# Patient Record
Sex: FEMALE | Race: WHITE | Employment: FULL TIME | URBAN - NONMETROPOLITAN AREA
[De-identification: names, ages, dates, MRNs, and addresses within clinical notes are randomized per-mention and may not be internally consistent; named-entity substitution may affect disease eponyms.]

---

## 2021-11-04 ENCOUNTER — APPOINTMENT (OUTPATIENT)
Dept: CT IMAGING | Age: 66
End: 2021-11-04
Payer: MEDICAID

## 2021-11-04 ENCOUNTER — HOSPITAL ENCOUNTER (EMERGENCY)
Age: 66
Discharge: HOME | End: 2021-11-04
Payer: MEDICAID

## 2021-11-04 VITALS
WEIGHT: 145 LBS | HEART RATE: 71 BPM | TEMPERATURE: 99.1 F | HEIGHT: 66 IN | BODY MASS INDEX: 23.3 KG/M2 | OXYGEN SATURATION: 95 % | RESPIRATION RATE: 15 BRPM | DIASTOLIC BLOOD PRESSURE: 66 MMHG | SYSTOLIC BLOOD PRESSURE: 110 MMHG

## 2021-11-04 DIAGNOSIS — L03.311 CELLULITIS OF ABDOMINAL WALL: ICD-10-CM

## 2021-11-04 DIAGNOSIS — K42.9 PERIUMBILICAL HERNIA: Primary | ICD-10-CM

## 2021-11-04 LAB
ALBUMIN SERPL-MCNC: 4.2 G/DL (ref 3.5–5.2)
ALP BLD-CCNC: 74 U/L (ref 35–104)
ALT SERPL-CCNC: 11 U/L (ref 5–33)
AMORPHOUS: ABNORMAL /HPF
ANION GAP SERPL CALCULATED.3IONS-SCNC: 11 MMOL/L (ref 7–19)
AST SERPL-CCNC: 17 U/L (ref 5–32)
BASOPHILS ABSOLUTE: 0 K/UL (ref 0–0.2)
BASOPHILS RELATIVE PERCENT: 0.6 % (ref 0–1)
BILIRUB SERPL-MCNC: 0.3 MG/DL (ref 0.2–1.2)
BILIRUBIN URINE: NEGATIVE
BLOOD, URINE: NEGATIVE
BUN BLDV-MCNC: 9 MG/DL (ref 8–23)
CALCIUM SERPL-MCNC: 9 MG/DL (ref 8.8–10.2)
CHLORIDE BLD-SCNC: 104 MMOL/L (ref 98–111)
CLARITY: CLEAR
CO2: 24 MMOL/L (ref 22–29)
COLOR: YELLOW
CREAT SERPL-MCNC: 0.7 MG/DL (ref 0.5–0.9)
EOSINOPHILS ABSOLUTE: 0 K/UL (ref 0–0.6)
EOSINOPHILS RELATIVE PERCENT: 0.9 % (ref 0–5)
EPITHELIAL CELLS, UA: ABNORMAL /HPF
GFR AFRICAN AMERICAN: >59
GFR NON-AFRICAN AMERICAN: >60
GLUCOSE BLD-MCNC: 92 MG/DL (ref 74–109)
GLUCOSE URINE: NEGATIVE MG/DL
HCT VFR BLD CALC: 42.7 % (ref 37–47)
HEMOGLOBIN: 13.4 G/DL (ref 12–16)
IMMATURE GRANULOCYTES #: 0 K/UL
KETONES, URINE: 15 MG/DL
LACTIC ACID, SEPSIS: 1 MG/DL (ref 0.5–1.9)
LEUKOCYTE ESTERASE, URINE: ABNORMAL
LIPASE: 36 U/L (ref 13–60)
LYMPHOCYTES ABSOLUTE: 1 K/UL (ref 1.1–4.5)
LYMPHOCYTES RELATIVE PERCENT: 22.3 % (ref 20–40)
MCH RBC QN AUTO: 29.2 PG (ref 27–31)
MCHC RBC AUTO-ENTMCNC: 31.4 G/DL (ref 33–37)
MCV RBC AUTO: 93 FL (ref 81–99)
MONOCYTES ABSOLUTE: 0.3 K/UL (ref 0–0.9)
MONOCYTES RELATIVE PERCENT: 6.2 % (ref 0–10)
NEUTROPHILS ABSOLUTE: 3.3 K/UL (ref 1.5–7.5)
NEUTROPHILS RELATIVE PERCENT: 69.8 % (ref 50–65)
NITRITE, URINE: NEGATIVE
PDW BLD-RTO: 12.7 % (ref 11.5–14.5)
PH UA: 6 (ref 5–8)
PLATELET # BLD: 176 K/UL (ref 130–400)
PMV BLD AUTO: 10.2 FL (ref 9.4–12.3)
POTASSIUM REFLEX MAGNESIUM: 3.7 MMOL/L (ref 3.5–5)
PROTEIN UA: NEGATIVE MG/DL
RBC # BLD: 4.59 M/UL (ref 4.2–5.4)
RBC UA: ABNORMAL /HPF (ref 0–2)
SODIUM BLD-SCNC: 139 MMOL/L (ref 136–145)
SPECIFIC GRAVITY UA: 1.01 (ref 1–1.03)
TOTAL PROTEIN: 6.9 G/DL (ref 6.6–8.7)
UROBILINOGEN, URINE: 0.2 E.U./DL
WBC # BLD: 4.7 K/UL (ref 4.8–10.8)
WBC UA: ABNORMAL /HPF (ref 0–5)

## 2021-11-04 PROCEDURE — 99282 EMERGENCY DEPT VISIT SF MDM: CPT

## 2021-11-04 PROCEDURE — 83605 ASSAY OF LACTIC ACID: CPT

## 2021-11-04 PROCEDURE — 6360000004 HC RX CONTRAST MEDICATION: Performed by: PHYSICIAN ASSISTANT

## 2021-11-04 PROCEDURE — 2580000003 HC RX 258: Performed by: PHYSICIAN ASSISTANT

## 2021-11-04 PROCEDURE — 6360000002 HC RX W HCPCS: Performed by: PHYSICIAN ASSISTANT

## 2021-11-04 PROCEDURE — 96375 TX/PRO/DX INJ NEW DRUG ADDON: CPT

## 2021-11-04 PROCEDURE — 80053 COMPREHEN METABOLIC PANEL: CPT

## 2021-11-04 PROCEDURE — 96374 THER/PROPH/DIAG INJ IV PUSH: CPT

## 2021-11-04 PROCEDURE — 87040 BLOOD CULTURE FOR BACTERIA: CPT

## 2021-11-04 PROCEDURE — 85025 COMPLETE CBC W/AUTO DIFF WBC: CPT

## 2021-11-04 PROCEDURE — 81001 URINALYSIS AUTO W/SCOPE: CPT

## 2021-11-04 PROCEDURE — 74177 CT ABD & PELVIS W/CONTRAST: CPT

## 2021-11-04 PROCEDURE — 83690 ASSAY OF LIPASE: CPT

## 2021-11-04 PROCEDURE — 36415 COLL VENOUS BLD VENIPUNCTURE: CPT

## 2021-11-04 RX ORDER — CEPHALEXIN 500 MG/1
500 CAPSULE ORAL 2 TIMES DAILY
Qty: 14 CAPSULE | Refills: 0 | Status: SHIPPED | OUTPATIENT
Start: 2021-11-04 | End: 2021-11-11

## 2021-11-04 RX ORDER — LANOLIN ALCOHOL/MO/W.PET/CERES
800 CREAM (GRAM) TOPICAL DAILY
COMMUNITY

## 2021-11-04 RX ORDER — ONDANSETRON 2 MG/ML
4 INJECTION INTRAMUSCULAR; INTRAVENOUS ONCE
Status: COMPLETED | OUTPATIENT
Start: 2021-11-04 | End: 2021-11-04

## 2021-11-04 RX ORDER — MULTIVITAMIN WITH IRON
250 TABLET ORAL DAILY
COMMUNITY

## 2021-11-04 RX ORDER — 0.9 % SODIUM CHLORIDE 0.9 %
1000 INTRAVENOUS SOLUTION INTRAVENOUS ONCE
Status: COMPLETED | OUTPATIENT
Start: 2021-11-04 | End: 2021-11-04

## 2021-11-04 RX ORDER — MORPHINE SULFATE 4 MG/ML
4 INJECTION, SOLUTION INTRAMUSCULAR; INTRAVENOUS ONCE
Status: COMPLETED | OUTPATIENT
Start: 2021-11-04 | End: 2021-11-04

## 2021-11-04 RX ORDER — ASPIRIN/CALCIUM/MAG/ALUMINUM 325 MG
TABLET ORAL
COMMUNITY

## 2021-11-04 RX ORDER — LANOLIN ALCOHOL/MO/W.PET/CERES
1000 CREAM (GRAM) TOPICAL DAILY
COMMUNITY

## 2021-11-04 RX ADMIN — SODIUM CHLORIDE 1000 ML: 9 INJECTION, SOLUTION INTRAVENOUS at 11:27

## 2021-11-04 RX ADMIN — ONDANSETRON 4 MG: 2 INJECTION INTRAMUSCULAR; INTRAVENOUS at 13:42

## 2021-11-04 RX ADMIN — IOPAMIDOL 95 ML: 755 INJECTION, SOLUTION INTRAVENOUS at 12:30

## 2021-11-04 RX ADMIN — MORPHINE SULFATE 4 MG: 4 INJECTION, SOLUTION INTRAMUSCULAR; INTRAVENOUS at 13:41

## 2021-11-04 ASSESSMENT — ENCOUNTER SYMPTOMS
ABDOMINAL PAIN: 1
ABDOMINAL DISTENTION: 0
EYE PAIN: 0
COLOR CHANGE: 1
COUGH: 0
SORE THROAT: 0
RHINORRHEA: 0
VOMITING: 1
BACK PAIN: 0
NAUSEA: 1
EYE DISCHARGE: 0
SHORTNESS OF BREATH: 0
PHOTOPHOBIA: 0
APNEA: 0

## 2021-11-04 ASSESSMENT — PAIN SCALES - GENERAL: PAINLEVEL_OUTOF10: 6

## 2021-11-04 NOTE — ED TRIAGE NOTES
Per pt she was seen at [facility] yesterday and dx with an umbilical hernia.  Per pt she was told to come here bc \" I dont have insurance and you all will be able to help me  With that\"

## 2021-11-04 NOTE — Clinical Note
Magdalena Max was seen and treated in our emergency department on 11/4/2021. She may return to work on 11/10/2021. Lifting restrictions (heavy) until cleared by general surgery     If you have any questions or concerns, please don't hesitate to call.       BHARATH Morales

## 2021-11-04 NOTE — ED PROVIDER NOTES
[facility] EMERGENCY DEPT  eMERGENCYdEPARTMENT eNCOUnter      Pt Name: Claudia Rosen  MRN: [id number]  [place] 1955  Date of evaluation: 2021  Provider:BHARATH Lux    CHIEF COMPLAINT       Chief Complaint   Patient presents with    Emesis     x 2 today         HISTORY OF PRESENT ILLNESS  (Location/Symptom, Timing/Onset, Context/Setting, Quality, Duration, Modifying Factors, Severity.)   Claudia Rosen is a 61 y.o. female who presents to the emergency department with complaints of periumbilical pain she has a known umbilical hernia she has had for quite some time she tells me  it started to hurt significantly more she had her boss look at it on Monday there is suspicion for infection as its got swelling and redness around the umbilicus she went to [facility] yesterday they were confirmed concerned about infection and possible incarceration told her to come to the ER she is here today she drank her coffee this morning around 7 AM and threw it up but then was able to keep down a breakfast bar she denies making any flatus denies any abnormal change in her stool output no  complaints. 99.1 temp here no diabetes or smoker status. Prior  section his only abdominal surgical history. HPI    Nursing Notes were reviewed and I agree. REVIEW OF SYSTEMS    (2-9 systems for level 4, 10 or more for level 5)     Review of Systems   Constitutional: Negative for activity change, appetite change, chills and fever. HENT: Negative for congestion, postnasal drip, rhinorrhea and sore throat. Eyes: Negative for photophobia, pain, discharge and visual disturbance. Respiratory: Negative for apnea, cough and shortness of breath. Cardiovascular: Negative for chest pain and leg swelling. Gastrointestinal: Positive for abdominal pain, nausea and vomiting. Negative for abdominal distention. Genitourinary: Negative for vaginal bleeding.    Musculoskeletal: Negative for arthralgias, back pain, joint swelling, neck pain and neck stiffness. Skin: Positive for color change. Negative for rash. Neurological: Negative for dizziness, syncope, facial asymmetry and headaches. Hematological: Negative for adenopathy. Does not bruise/bleed easily. Psychiatric/Behavioral: Negative for agitation, behavioral problems and confusion. Except as noted above the remainder of the review of systems was reviewed and negative. PAST MEDICAL HISTORY   History reviewed. No pertinent past medical history. SURGICAL HISTORY       Past Surgical History:   Procedure Laterality Date    ADENOIDECTOMY      BREAST SURGERY      augmentation     SECTION      TONSILLECTOMY           CURRENT MEDICATIONS       Discharge Medication List as of 2021  2:08 PM      CONTINUE these medications which have NOT CHANGED    Details   folic acid (FOLVITE) 303 MCG tablet Take 800 mcg by mouth dailyHistorical Med      magnesium (MAGNESIUM-OXIDE) 250 MG TABS tablet Take 250 mg by mouth dailyHistorical Med      Potassium 99 MG TABS Take by mouthHistorical Med      vitamin B-12 (CYANOCOBALAMIN) 1000 MCG tablet Take 1,000 mcg by mouth dailyHistorical Med      Aspirin Buf,AaJbr-JpRyq-NzAtx, (BUFFERED ASPIRIN) 325 MG TABS Take by mouthHistorical Med             ALLERGIES     Patient has no known allergies.     FAMILY HISTORY       Family History   Problem Relation Age of Onset    High Blood Pressure Mother     Other Mother         CHF    Other Father         unknown          SOCIAL HISTORY       Social History     Socioeconomic History    Marital status: Single     Spouse name: None    Number of children: None    Years of education: None    Highest education level: None   Occupational History    None   Tobacco Use    Smoking status: Never Smoker    Smokeless tobacco: Never Used   Vaping Use    Vaping Use: Never used   Substance and Sexual Activity    Alcohol use: Yes     Comment: occasional    Drug use: Not Currently  Sexual activity: None   Other Topics Concern    None   Social History Narrative    None     Social Determinants of Health     Financial Resource Strain:     Difficulty of Paying Living Expenses:    Food Insecurity:     Worried About Running Out of Food in the Last Year:     [address] in the Last Year:    Transportation Needs:     Lack of Transportation (Medical):  Lack of Transportation (Non-Medical):    Physical Activity:     Days of Exercise per Week:     Minutes of Exercise per Session:    Stress:     Feeling of Stress :    Social Connections:     Frequency of Communication with Friends and Family:     Frequency of Social Gatherings with Friends and Family:     Attends Spiritism Services:     Active Member of Clubs or Organizations:     Attends Club or Organization Meetings:     Marital Status:    Intimate Partner Violence:     Fear of Current or Ex-Partner:     Emotionally Abused:     Physically Abused:     Sexually Abused:        SCREENINGS           PHYSICAL EXAM    (up to 7 forlevel 4, 8 or more for level 5)     ED Triage Vitals [11/04/21 0933]   BP Temp Temp Source Pulse Resp SpO2 Height Weight   110/66 99.1 °F (37.3 °C) Oral 71 15 95 % 5' 6\" (1.676 m) 145 lb (65.8 kg)       Physical Exam  Vitals and nursing note reviewed. Constitutional:       General: She is not in acute distress. Appearance: Normal appearance. She is well-developed. She is not diaphoretic. HENT:      Head: Normocephalic and atraumatic. Right Ear: Tympanic membrane, ear canal and external ear normal.      Left Ear: Tympanic membrane, ear canal and external ear normal.      Nose: Nose normal.      Mouth/Throat:      Mouth: Mucous membranes are moist.      Pharynx: No oropharyngeal exudate. Eyes:      General:         Right eye: No discharge. Left eye: No discharge. Pupils: Pupils are equal, round, and reactive to light. Neck:      Thyroid: No thyromegaly.    Cardiovascular: may be further evaluated. The periumbilical herniation of the omental fat with infiltration and   thickening which may suggest inflammation or ischemia. Possibility of   incarceration is not excluded. Clinical correlation and further   follow-up is recommended. Signed by Dr Margaret Delgadillo:  Celestine Sandifer - Abnormal; Notable for the following components:       Result Value    WBC 4.7 (*)     MCHC 31.4 (*)     Neutrophils % 69.8 (*)     Lymphocytes Absolute 1.0 (*)     All other components within normal limits   URINE RT REFLEX TO CULTURE - Abnormal; Notable for the following components:    Ketones, Urine 15 (*)     Leukocyte Esterase, Urine TRACE (*)     All other components within normal limits   MICROSCOPIC URINALYSIS - Abnormal; Notable for the following components:    Amorphous, UA Rare (*)     All other components within normal limits   CULTURE, BLOOD 1    Narrative:     ORDER#: [id number]                          ORDERED BY: DUSTIN HARTMANN  SOURCE: Blood rac                          COLLECTED:  11/04/21 10:55  ANTIBIOTICS AT [facility].:                      RECEIVED :  11/04/21 11:03   CULTURE, BLOOD 2    Narrative:     ORDER#: [id number]                          ORDERED BY: DUSTIN HARTMANN  SOURCE: Blood r arm                        COLLECTED:  11/04/21 11:18  ANTIBIOTICS AT [facility].:                      RECEIVED :  11/04/21 11:27   COMPREHENSIVE METABOLIC PANEL W/ REFLEX TO MG FOR LOW K   LIPASE   LACTATE, SEPSIS       All other labs were within normal range or notreturned as of this dictation.     RE-ASSESSMENT        EMERGENCY DEPARTMENT COURSE and DIFFERENTIAL DIAGNOSIS/MDM:   Vitals:    Vitals:    11/04/21 0933   BP: 110/66   Pulse: 71   Resp: 15   Temp: 99.1 °F (37.3 °C)   TempSrc: Oral   SpO2: 95%   Weight: 145 lb (65.8 kg)   Height: 5' 6\" (1.676 m)       MDM  Able to reduce the hernia here suspect skin infection I have spoken with Dr Antoni Drummond based on ct abdomen feels she is appropriate for follow with one of his general surgery partners. Start keflex. Went over findings on ct in regards to incidental finding large pelvic mass. She verbalizes to me she will follow with her OBGYN. Plan for discharge. She is reduced in her pain passing po challenge prior to discharge. Normal WBC and lactic. Told to watch temp at home. PROCEDURES:    Procedures      FINAL IMPRESSION      1. Periumbilical hernia    2. Cellulitis of abdominal wall          DISPOSITION/PLAN   DISPOSITION Decision To Discharge 11/04/2021 02:07:20 PM      PATIENT REFERRED TO:  [facility] EMERGENCY DEPT  [facility]  [phone]    If symptoms worsen    Stefanie Min MD  [facility] [phone] [facility]    Schedule an appointment as soon as possible for a visit         DISCHARGE MEDICATIONS:  Discharge Medication List as of 11/4/2021  2:08 PM      START taking these medications    Details   cephALEXin (KEFLEX) 500 MG capsule Take 1 capsule by mouth 2 times daily for 7 days, Disp-14 capsule, R-0Normal             (Please note that portions of this note were completed with a voice recognition program.  Efforts were made to edit the dictations but occasionallywords are mis-transcribed.)    Alondra Mcknight [phone]  11/05/21 6433

## 2021-11-04 NOTE — Clinical Note
Monico Higgins was seen and treated in our emergency department on 11/4/2021. She may return to work on 11/05/2021. If you have any questions or concerns, please don't hesitate to call.       BHARATH Covarrubias

## 2021-11-05 ENCOUNTER — OFFICE VISIT (OUTPATIENT)
Dept: SURGERY | Age: 66
End: 2021-11-05
Payer: MEDICAID

## 2021-11-05 ENCOUNTER — PREP FOR PROCEDURE (OUTPATIENT)
Dept: SURGERY | Age: 66
End: 2021-11-05

## 2021-11-05 ENCOUNTER — HOSPITAL ENCOUNTER (OUTPATIENT)
Age: 66
Setting detail: SURGERY ADMIT
Discharge: HOME | End: 2021-11-05
Attending: SURGERY | Admitting: SURGERY
Payer: MEDICAID

## 2021-11-05 ENCOUNTER — ANESTHESIA (OUTPATIENT)
Dept: OPERATING ROOM | Age: 66
End: 2021-11-05
Payer: MEDICAID

## 2021-11-05 ENCOUNTER — ANESTHESIA EVENT (OUTPATIENT)
Dept: OPERATING ROOM | Age: 66
End: 2021-11-05
Payer: MEDICAID

## 2021-11-05 VITALS — OXYGEN SATURATION: 100 % | SYSTOLIC BLOOD PRESSURE: 115 MMHG | DIASTOLIC BLOOD PRESSURE: 59 MMHG | TEMPERATURE: 98.1 F

## 2021-11-05 VITALS
RESPIRATION RATE: 16 BRPM | DIASTOLIC BLOOD PRESSURE: 85 MMHG | HEIGHT: 66 IN | OXYGEN SATURATION: 99 % | SYSTOLIC BLOOD PRESSURE: 124 MMHG | BODY MASS INDEX: 23.78 KG/M2 | WEIGHT: 148 LBS | HEART RATE: 76 BPM | TEMPERATURE: 98.3 F

## 2021-11-05 VITALS
WEIGHT: 148.6 LBS | HEIGHT: 66 IN | BODY MASS INDEX: 23.88 KG/M2 | SYSTOLIC BLOOD PRESSURE: 106 MMHG | TEMPERATURE: 98 F | DIASTOLIC BLOOD PRESSURE: 68 MMHG

## 2021-11-05 DIAGNOSIS — K42.0 INCARCERATED UMBILICAL HERNIA: Primary | ICD-10-CM

## 2021-11-05 DIAGNOSIS — K42.0 STRANGULATED UMBILICAL HERNIA: Primary | ICD-10-CM

## 2021-11-05 LAB — SARS-COV-2, NAAT: NOT DETECTED

## 2021-11-05 PROCEDURE — 49587 REPAIR UMBILICAL HERN,5+Y/O,STRANG: CPT | Performed by: SURGERY

## 2021-11-05 PROCEDURE — 99203 OFFICE O/P NEW LOW 30 MIN: CPT | Performed by: SURGERY

## 2021-11-05 PROCEDURE — 3600000014 HC SURGERY LEVEL 4 ADDTL 15MIN: Performed by: SURGERY

## 2021-11-05 PROCEDURE — 7100000010 HC PHASE II RECOVERY - FIRST 15 MIN: Performed by: SURGERY

## 2021-11-05 PROCEDURE — 7100000001 HC PACU RECOVERY - ADDTL 15 MIN: Performed by: SURGERY

## 2021-11-05 PROCEDURE — 2500000003 HC RX 250 WO HCPCS: Performed by: NURSE ANESTHETIST, CERTIFIED REGISTERED

## 2021-11-05 PROCEDURE — 6360000002 HC RX W HCPCS: Performed by: SURGERY

## 2021-11-05 PROCEDURE — 6360000002 HC RX W HCPCS: Performed by: ANESTHESIOLOGY

## 2021-11-05 PROCEDURE — 87635 SARS-COV-2 COVID-19 AMP PRB: CPT

## 2021-11-05 PROCEDURE — 2580000003 HC RX 258: Performed by: NURSE ANESTHETIST, CERTIFIED REGISTERED

## 2021-11-05 PROCEDURE — 3700000001 HC ADD 15 MINUTES (ANESTHESIA): Performed by: SURGERY

## 2021-11-05 PROCEDURE — 2500000003 HC RX 250 WO HCPCS: Performed by: SURGERY

## 2021-11-05 PROCEDURE — 7100000000 HC PACU RECOVERY - FIRST 15 MIN: Performed by: SURGERY

## 2021-11-05 PROCEDURE — 2709999900 HC NON-CHARGEABLE SUPPLY: Performed by: SURGERY

## 2021-11-05 PROCEDURE — 6360000002 HC RX W HCPCS: Performed by: NURSE ANESTHETIST, CERTIFIED REGISTERED

## 2021-11-05 PROCEDURE — 93005 ELECTROCARDIOGRAM TRACING: CPT | Performed by: ANESTHESIOLOGY

## 2021-11-05 PROCEDURE — 3600000004 HC SURGERY LEVEL 4 BASE: Performed by: SURGERY

## 2021-11-05 PROCEDURE — 3700000000 HC ANESTHESIA ATTENDED CARE: Performed by: SURGERY

## 2021-11-05 RX ORDER — SODIUM CHLORIDE 0.9 % (FLUSH) 0.9 %
5-40 SYRINGE (ML) INJECTION PRN
Status: DISCONTINUED | OUTPATIENT
Start: 2021-11-05 | End: 2021-11-05 | Stop reason: HOSPADM

## 2021-11-05 RX ORDER — MIDAZOLAM HYDROCHLORIDE 1 MG/ML
2 INJECTION INTRAMUSCULAR; INTRAVENOUS
Status: DISCONTINUED | OUTPATIENT
Start: 2021-11-05 | End: 2021-11-05 | Stop reason: HOSPADM

## 2021-11-05 RX ORDER — HYDRALAZINE HYDROCHLORIDE 20 MG/ML
5 INJECTION INTRAMUSCULAR; INTRAVENOUS EVERY 10 MIN PRN
Status: DISCONTINUED | OUTPATIENT
Start: 2021-11-05 | End: 2021-11-05 | Stop reason: HOSPADM

## 2021-11-05 RX ORDER — SODIUM CHLORIDE, SODIUM LACTATE, POTASSIUM CHLORIDE, CALCIUM CHLORIDE 600; 310; 30; 20 MG/100ML; MG/100ML; MG/100ML; MG/100ML
INJECTION, SOLUTION INTRAVENOUS CONTINUOUS PRN
Status: DISCONTINUED | OUTPATIENT
Start: 2021-11-05 | End: 2021-11-05 | Stop reason: SDUPTHER

## 2021-11-05 RX ORDER — PROCHLORPERAZINE EDISYLATE 5 MG/ML
5 INJECTION INTRAMUSCULAR; INTRAVENOUS
Status: DISCONTINUED | OUTPATIENT
Start: 2021-11-05 | End: 2021-11-05 | Stop reason: HOSPADM

## 2021-11-05 RX ORDER — SODIUM CHLORIDE 0.9 % (FLUSH) 0.9 %
5-40 SYRINGE (ML) INJECTION EVERY 12 HOURS SCHEDULED
Status: DISCONTINUED | OUTPATIENT
Start: 2021-11-05 | End: 2021-11-05 | Stop reason: HOSPADM

## 2021-11-05 RX ORDER — SODIUM CHLORIDE, SODIUM LACTATE, POTASSIUM CHLORIDE, CALCIUM CHLORIDE 600; 310; 30; 20 MG/100ML; MG/100ML; MG/100ML; MG/100ML
INJECTION, SOLUTION INTRAVENOUS CONTINUOUS
Status: DISCONTINUED | OUTPATIENT
Start: 2021-11-05 | End: 2021-11-05 | Stop reason: HOSPADM

## 2021-11-05 RX ORDER — HYDROMORPHONE HYDROCHLORIDE 1 MG/ML
0.5 INJECTION, SOLUTION INTRAMUSCULAR; INTRAVENOUS; SUBCUTANEOUS EVERY 5 MIN PRN
Status: DISCONTINUED | OUTPATIENT
Start: 2021-11-05 | End: 2021-11-05 | Stop reason: HOSPADM

## 2021-11-05 RX ORDER — LIDOCAINE HYDROCHLORIDE 10 MG/ML
INJECTION, SOLUTION EPIDURAL; INFILTRATION; INTRACAUDAL; PERINEURAL PRN
Status: DISCONTINUED | OUTPATIENT
Start: 2021-11-05 | End: 2021-11-05 | Stop reason: SDUPTHER

## 2021-11-05 RX ORDER — SODIUM CHLORIDE 9 MG/ML
25 INJECTION, SOLUTION INTRAVENOUS PRN
Status: CANCELLED | OUTPATIENT
Start: 2021-11-05

## 2021-11-05 RX ORDER — HYDROMORPHONE HYDROCHLORIDE 1 MG/ML
0.25 INJECTION, SOLUTION INTRAMUSCULAR; INTRAVENOUS; SUBCUTANEOUS EVERY 5 MIN PRN
Status: DISCONTINUED | OUTPATIENT
Start: 2021-11-05 | End: 2021-11-05 | Stop reason: HOSPADM

## 2021-11-05 RX ORDER — SODIUM CHLORIDE 0.9 % (FLUSH) 0.9 %
5-40 SYRINGE (ML) INJECTION EVERY 12 HOURS SCHEDULED
Status: CANCELLED | OUTPATIENT
Start: 2021-11-05

## 2021-11-05 RX ORDER — FENTANYL CITRATE 50 UG/ML
25 INJECTION, SOLUTION INTRAMUSCULAR; INTRAVENOUS EVERY 5 MIN PRN
Status: DISCONTINUED | OUTPATIENT
Start: 2021-11-05 | End: 2021-11-05 | Stop reason: HOSPADM

## 2021-11-05 RX ORDER — PROPOFOL 10 MG/ML
INJECTION, EMULSION INTRAVENOUS PRN
Status: DISCONTINUED | OUTPATIENT
Start: 2021-11-05 | End: 2021-11-05 | Stop reason: SDUPTHER

## 2021-11-05 RX ORDER — ONDANSETRON 4 MG/1
4 TABLET, ORALLY DISINTEGRATING ORAL EVERY 6 HOURS PRN
Qty: 15 TABLET | Refills: 0 | Status: SHIPPED | OUTPATIENT
Start: 2021-11-05

## 2021-11-05 RX ORDER — HYDROCODONE BITARTRATE AND ACETAMINOPHEN 5; 325 MG/1; MG/1
1 TABLET ORAL EVERY 4 HOURS PRN
Qty: 15 TABLET | Refills: 0 | Status: SHIPPED | OUTPATIENT
Start: 2021-11-05 | End: 2021-11-10

## 2021-11-05 RX ORDER — ONDANSETRON 2 MG/ML
4 INJECTION INTRAMUSCULAR; INTRAVENOUS
Status: COMPLETED | OUTPATIENT
Start: 2021-11-05 | End: 2021-11-05

## 2021-11-05 RX ORDER — LIDOCAINE HYDROCHLORIDE 10 MG/ML
1 INJECTION, SOLUTION EPIDURAL; INFILTRATION; INTRACAUDAL; PERINEURAL
Status: DISCONTINUED | OUTPATIENT
Start: 2021-11-05 | End: 2021-11-05 | Stop reason: HOSPADM

## 2021-11-05 RX ORDER — LABETALOL HYDROCHLORIDE 5 MG/ML
5 INJECTION, SOLUTION INTRAVENOUS EVERY 10 MIN PRN
Status: DISCONTINUED | OUTPATIENT
Start: 2021-11-05 | End: 2021-11-05 | Stop reason: HOSPADM

## 2021-11-05 RX ORDER — FENTANYL CITRATE 50 UG/ML
INJECTION, SOLUTION INTRAMUSCULAR; INTRAVENOUS PRN
Status: DISCONTINUED | OUTPATIENT
Start: 2021-11-05 | End: 2021-11-05 | Stop reason: SDUPTHER

## 2021-11-05 RX ORDER — FENTANYL CITRATE 50 UG/ML
50 INJECTION, SOLUTION INTRAMUSCULAR; INTRAVENOUS EVERY 5 MIN PRN
Status: DISCONTINUED | OUTPATIENT
Start: 2021-11-05 | End: 2021-11-05 | Stop reason: HOSPADM

## 2021-11-05 RX ORDER — POLYETHYLENE GLYCOL 3350 17 G/17G
17 POWDER, FOR SOLUTION ORAL DAILY
COMMUNITY

## 2021-11-05 RX ORDER — ROCURONIUM BROMIDE 10 MG/ML
INJECTION, SOLUTION INTRAVENOUS PRN
Status: DISCONTINUED | OUTPATIENT
Start: 2021-11-05 | End: 2021-11-05 | Stop reason: SDUPTHER

## 2021-11-05 RX ORDER — DEXAMETHASONE SODIUM PHOSPHATE 10 MG/ML
INJECTION, SOLUTION INTRAMUSCULAR; INTRAVENOUS PRN
Status: DISCONTINUED | OUTPATIENT
Start: 2021-11-05 | End: 2021-11-05 | Stop reason: SDUPTHER

## 2021-11-05 RX ORDER — DIPHENHYDRAMINE HYDROCHLORIDE 50 MG/ML
12.5 INJECTION INTRAMUSCULAR; INTRAVENOUS
Status: DISCONTINUED | OUTPATIENT
Start: 2021-11-05 | End: 2021-11-05 | Stop reason: HOSPADM

## 2021-11-05 RX ORDER — SODIUM CHLORIDE 9 MG/ML
25 INJECTION, SOLUTION INTRAVENOUS PRN
Status: DISCONTINUED | OUTPATIENT
Start: 2021-11-05 | End: 2021-11-05 | Stop reason: HOSPADM

## 2021-11-05 RX ORDER — ENALAPRILAT 2.5 MG/2ML
1.25 INJECTION INTRAVENOUS
Status: DISCONTINUED | OUTPATIENT
Start: 2021-11-05 | End: 2021-11-05 | Stop reason: HOSPADM

## 2021-11-05 RX ORDER — CEFAZOLIN SODIUM 2 G/100ML
2000 INJECTION, SOLUTION INTRAVENOUS
Status: COMPLETED | OUTPATIENT
Start: 2021-11-05 | End: 2021-11-05

## 2021-11-05 RX ORDER — SUCCINYLCHOLINE CHLORIDE 20 MG/ML
INJECTION INTRAMUSCULAR; INTRAVENOUS PRN
Status: DISCONTINUED | OUTPATIENT
Start: 2021-11-05 | End: 2021-11-05 | Stop reason: SDUPTHER

## 2021-11-05 RX ORDER — SODIUM CHLORIDE 0.9 % (FLUSH) 0.9 %
5-40 SYRINGE (ML) INJECTION PRN
Status: CANCELLED | OUTPATIENT
Start: 2021-11-05

## 2021-11-05 RX ORDER — MEPERIDINE HYDROCHLORIDE 25 MG/ML
12.5 INJECTION INTRAMUSCULAR; INTRAVENOUS; SUBCUTANEOUS EVERY 5 MIN PRN
Status: DISCONTINUED | OUTPATIENT
Start: 2021-11-05 | End: 2021-11-05 | Stop reason: HOSPADM

## 2021-11-05 RX ORDER — BUPIVACAINE HYDROCHLORIDE 2.5 MG/ML
INJECTION, SOLUTION INFILTRATION; PERINEURAL PRN
Status: DISCONTINUED | OUTPATIENT
Start: 2021-11-05 | End: 2021-11-05 | Stop reason: ALTCHOICE

## 2021-11-05 RX ORDER — FENTANYL CITRATE 50 UG/ML
50 INJECTION, SOLUTION INTRAMUSCULAR; INTRAVENOUS
Status: DISCONTINUED | OUTPATIENT
Start: 2021-11-05 | End: 2021-11-05 | Stop reason: HOSPADM

## 2021-11-05 RX ORDER — ONDANSETRON 2 MG/ML
INJECTION INTRAMUSCULAR; INTRAVENOUS PRN
Status: DISCONTINUED | OUTPATIENT
Start: 2021-11-05 | End: 2021-11-05 | Stop reason: SDUPTHER

## 2021-11-05 RX ORDER — FENTANYL CITRATE 50 UG/ML
25 INJECTION, SOLUTION INTRAMUSCULAR; INTRAVENOUS
Status: DISCONTINUED | OUTPATIENT
Start: 2021-11-05 | End: 2021-11-05 | Stop reason: HOSPADM

## 2021-11-05 RX ORDER — SODIUM CHLORIDE 9 MG/ML
INJECTION, SOLUTION INTRAVENOUS CONTINUOUS
Status: DISCONTINUED | OUTPATIENT
Start: 2021-11-05 | End: 2021-11-05 | Stop reason: HOSPADM

## 2021-11-05 RX ADMIN — HYDROMORPHONE HYDROCHLORIDE 0.5 MG: 1 INJECTION, SOLUTION INTRAMUSCULAR; INTRAVENOUS; SUBCUTANEOUS at 18:29

## 2021-11-05 RX ADMIN — FENTANYL CITRATE 100 MCG: 50 INJECTION, SOLUTION INTRAMUSCULAR; INTRAVENOUS at 16:53

## 2021-11-05 RX ADMIN — HYDROMORPHONE HYDROCHLORIDE 0.25 MG: 1 INJECTION, SOLUTION INTRAMUSCULAR; INTRAVENOUS; SUBCUTANEOUS at 17:52

## 2021-11-05 RX ADMIN — HYDROMORPHONE HYDROCHLORIDE 0.25 MG: 1 INJECTION, SOLUTION INTRAMUSCULAR; INTRAVENOUS; SUBCUTANEOUS at 18:19

## 2021-11-05 RX ADMIN — HYDROMORPHONE HYDROCHLORIDE 0.5 MG: 1 INJECTION, SOLUTION INTRAMUSCULAR; INTRAVENOUS; SUBCUTANEOUS at 18:03

## 2021-11-05 ASSESSMENT — ENCOUNTER SYMPTOMS
BACK PAIN: 0
EYE PAIN: 0
SHORTNESS OF BREATH: 0
DIARRHEA: 0
SORE THROAT: 0
ABDOMINAL DISTENTION: 0
NAUSEA: 0
ABDOMINAL PAIN: 1
EYE REDNESS: 0
CONSTIPATION: 1
COUGH: 0
SHORTNESS OF BREATH: 0

## 2021-11-05 ASSESSMENT — LIFESTYLE VARIABLES: SMOKING_STATUS: 0

## 2021-11-05 ASSESSMENT — PAIN DESCRIPTION - LOCATION
LOCATION: ABDOMEN

## 2021-11-05 ASSESSMENT — PAIN DESCRIPTION - PAIN TYPE
TYPE: SURGICAL PAIN

## 2021-11-05 ASSESSMENT — PAIN SCALES - GENERAL
PAINLEVEL_OUTOF10: 6
PAINLEVEL_OUTOF10: 5
PAINLEVEL_OUTOF10: 7
PAINLEVEL_OUTOF10: 7
PAINLEVEL_OUTOF10: 4
PAINLEVEL_OUTOF10: 6
PAINLEVEL_OUTOF10: 5

## 2021-11-05 ASSESSMENT — PAIN DESCRIPTION - DESCRIPTORS
DESCRIPTORS: ACHING
DESCRIPTORS: ACHING

## 2021-11-05 NOTE — ANESTHESIA PRE PROCEDURE
Department of Anesthesiology  Preprocedure Note       Name:  Jonah Rice   Age:  61 y.o.  :  1955                                          MRN:  [id number]         Date:  2021      Surgeon: Xavier Query):  Carol Ann Encinas MD    Procedure: Procedure(s): HERNIA UMBILICAL REPAIR    Medications prior to admission:   Prior to Admission medications    Medication Sig Start Date End Date Taking? Authorizing Provider   polyethylene glycol (MIRALAX) 17 g PACK packet Take 17 g by mouth daily   Yes Historical Provider, MD   folic acid (FOLVITE) 761 MCG tablet Take 800 mcg by mouth daily   Yes Historical Provider, MD   magnesium (MAGNESIUM-OXIDE) 250 MG TABS tablet Take 250 mg by mouth daily   Yes Historical Provider, MD   Potassium 99 MG TABS Take by mouth   Yes Historical Provider, MD   vitamin B-12 (CYANOCOBALAMIN) 1000 MCG tablet Take 1,000 mcg by mouth daily   Yes Historical Provider, MD   cephALEXin (KEFLEX) 500 MG capsule Take 1 capsule by mouth 2 times daily for 7 days 21 Yes BHARATH Mac   Aspirin Buf,OcMav-XpStf-SgLdz, (BUFFERED ASPIRIN) 325 MG TABS Take by mouth    Historical Provider, MD       Current medications:    Current Facility-Administered Medications   Medication Dose Route Frequency Provider Last Rate Last Admin    0.9 % sodium chloride infusion  25 mL IntraVENous PRN Keanu Cabrera MD        ceFAZolin (ANCEF) 2000 mg in dextrose 4 % 100 mL IVPB (premix)  2,000 mg IntraVENous On Call to [facility] Biju Gomez MD        sodium chloride flush 0.9 % injection 5-40 mL  5-40 mL IntraVENous 2 times per day Keanu Cabrera MD        sodium chloride flush 0.9 % injection 5-40 mL  5-40 mL IntraVENous PRN Keanu Cabrera MD           Allergies:  No Known Allergies    Problem List:  There is no problem list on file for this patient. Past Medical History:  History reviewed. No pertinent past medical history.     Past Surgical History:        Procedure Laterality Date    ADENOIDECTOMY  BREAST SURGERY      augmentation     SECTION      TONSILLECTOMY         Social History:    Social History     Tobacco Use    Smoking status: Never Smoker    Smokeless tobacco: Never Used   Substance Use Topics    Alcohol use: Yes     Comment: occasional                                Counseling given: Not Answered      Vital Signs (Current):   Vitals:    21 1153   BP: 137/72   Pulse: 72   Resp: 16   Temp: 99 °F (37.2 °C)   TempSrc: Tympanic   SpO2: 99%   Weight: 148 lb (67.1 kg)   Height: 5' 6\" (1.676 m)                                              BP Readings from Last 3 Encounters:   21 137/72   21 106/68   21 110/66       NPO Status: Time of last liquid consumption: 915                        Time of last solid consumption: 915                        Date of last liquid consumption: 21                        Date of last solid food consumption: 21    BMI:   Wt Readings from Last 3 Encounters:   21 148 lb (67.1 kg)   21 148 lb 9.6 oz (67.4 kg)   21 145 lb (65.8 kg)     Body mass index is 23.89 kg/m². CBC:   Lab Results   Component Value Date    WBC 4.7 2021    RBC 4.59 2021    HGB 13.4 2021    HCT 42.7 2021    MCV 93.0 2021    RDW 12.7 2021     2021       CMP:   Lab Results   Component Value Date     2021    K 3.7 2021     2021    CO2 24 2021    BUN 9 2021    CREATININE 0.7 2021    GFRAA >59 2021    LABGLOM >60 2021    GLUCOSE 92 2021    PROT 6.9 2021    CALCIUM 9.0 2021    BILITOT 0.3 2021    ALKPHOS 74 2021    AST 17 2021    ALT 11 2021       POC Tests: No results for input(s): POCGLU, POCNA, POCK, POCCL, POCBUN, POCHEMO, POCHCT in the last 72 hours.     Coags: No results found for: PROTIME, INR, APTT    HCG (If Applicable): No results found for: PREGTESTUR, PREGSERUM, HCG, HCGQUANT 11/5/2021

## 2021-11-05 NOTE — OP NOTE
Operative Note      Patient: Martinez Yoder  YOB: 1955  MRN: [id number]    SURGICAL DEPARTMENT REPORT    SURGEON:    Queta Raphael MD    DATE OF SERVICE: 11/5/2021    PREOPERATIVE DIAGNOSIS   Incarcerated umbilical hernia. POSTOPERATIVE DIAGNOSIS   Incarcerated umbilical hernia. PROCEDURE  Open repair of incarcerated umbilical hernia. INDICATION  Ms. Sandy Mitchell is a 61 y.o. female who has had a small umbilical hernia for some time. She presented to the emergency department yesterday with complaints of pain and erythema surrounding the umbilicus. CT showed omentum incarcerated within the hernia. The emergency department provider was able to reduce this and she was referred to the office for an appointment this morning to discuss elective repair. On presentation there she reported that the hernia had come back out and was quite painful. Following review of options for her care, she presents for urgent open repair. Meghan Linares  Ms. Sandy Mitchell was taken to the main operating room and placed on the operating table supine. After the induction of adequate general endotracheal anesthesia, the abdomen was prepped and draped to a sterile field. Time out was undertaken. Transverse skin incision was made just superior to the umbilicus and carried through the subcutaneous tissue until a hernia sac was encountered. The hernia sac was then sharply dissected free from the undersurface of the umbilical skin. It was further dissected free from the surrounding subcutaneous tissue all the way down to the fascial defect. I then opened the apex of the sac which contained omentum. The omentum was too large to fit easily through the hernia defect and I thus amputated it using cautery. Once hemostasis was assured I allow the stump to retract into the abdominal cavity.   The sac was then trimmed flush with the fascia  The defect measured about 1.5 cm in diameter, and I elected primary suture repair. The defect was closed with 2 figure of eight 0 PDS suture with good result. The defect came together without tension. The operative site was checked and hemostasis assured. I irrigated with warm sterile saline. The subcutaneous tissue was reapproximated with inturrupted 3-0 Vicryl. Skin was closed with Dermabond dressing. Sponge, needle and instrument count correct on 2 occasions. Estimated intraoperative blood loss was minimal.    Ms. Steven Palma tolerated her surgery well and she was taken to PACU in satisfactory condition.         ________________________________  Ban Danielle MD

## 2021-11-05 NOTE — ANESTHESIA POSTPROCEDURE EVALUATION
Department of Anesthesiology  Postprocedure Note    Patient: Zachary Srinivasan  MRN: [id number]  [place]: 1955  Date of evaluation: 11/5/2021  Time:  5:47 PM     Procedure Summary     Date: 11/05/21 Room / Location: [address]    Anesthesia Start: [id number] Anesthesia Stop: 5619    Procedure: HERNIA UMBILICAL REPAIR (N/A ) Diagnosis: (strangulated umbilical hernia)    Surgeons: Chas Otto MD Responsible Provider: BLANCA Wang CRNA    Anesthesia Type: general ASA Status: 2          Anesthesia Type: general    Linda Phase I: Linda Score: 9    Linda Phase II:      Last vitals: Reviewed and per EMR flowsheets.        Anesthesia Post Evaluation    Patient location during evaluation: bedside  Patient participation: complete - patient participated  Level of consciousness: awake and alert  Pain score: 2  Airway patency: patent  Nausea & Vomiting: no nausea and no vomiting  Complications: no  Cardiovascular status: hemodynamically stable  Respiratory status: acceptable, room air and spontaneous ventilation  Hydration status: euvolemic

## 2021-11-05 NOTE — H&P
Ms. Seferino Ornelas is a 61year old female who presents with a complaint of a painful umbilical hernia. She states that she has had a bulge there for years, but it never caused her any problems. Then on , she started having pain there. It became worse, and she had worsening pain. She went to the ER yesterday, and a CT was done showing a strangulated umbilical hernia with concern for ischemia vs infection. She was started on antibiotics and given morphine to reduce it. She states that the redness is maybe a little better today, but it is still bulging out and tender.      Past Medical History   History reviewed. No pertinent past medical history.      Past Surgical History         Past Surgical History:   Procedure Laterality Date    ADENOIDECTOMY        BREAST SURGERY         augmentation     SECTION        TONSILLECTOMY             Current Facility-Administered Medications          Current Outpatient Medications   Medication Sig Dispense Refill    polyethylene glycol (MIRALAX) 17 g PACK packet Take 17 g by mouth daily        folic acid (FOLVITE) 458 MCG tablet Take 800 mcg by mouth daily        magnesium (MAGNESIUM-OXIDE) 250 MG TABS tablet Take 250 mg by mouth daily        Potassium 99 MG TABS Take by mouth        Aspirin Buf,TcLyp-HgAmy-KvNdl, (BUFFERED ASPIRIN) 325 MG TABS Take by mouth        vitamin B-12 (CYANOCOBALAMIN) 1000 MCG tablet Take 1,000 mcg by mouth daily        cephALEXin (KEFLEX) 500 MG capsule Take 1 capsule by mouth 2 times daily for 7 days 14 capsule 0      No current facility-administered medications for this visit.         Allergies: Patient has no known allergies.     Family History         Family History   Problem Relation Age of Onset    High Blood Pressure Mother      Other Mother           CHF   [place] [employer] Other Father           unknown            Social History            Tobacco Use    Smoking status: Never Smoker    Smokeless tobacco: Never Used   Substance Use Topics    Alcohol use: Yes       Comment: occasional         Review of Systems   Constitutional: Negative for activity change and fever. HENT: Negative for congestion and sore throat. Eyes: Negative for pain and redness. Respiratory: Negative for cough and shortness of breath. Cardiovascular: Negative for chest pain and palpitations. Gastrointestinal: Positive for abdominal pain and constipation. Negative for abdominal distention, diarrhea and nausea. Endocrine: Negative for polydipsia and polyphagia. Genitourinary: Negative for dysuria and hematuria. Musculoskeletal: Negative for arthralgias and back pain. Neurological: Negative for dizziness and headaches. Psychiatric/Behavioral: Negative for confusion and dysphoric mood.         Physical Exam  Vitals reviewed. Constitutional:       General: She is not in acute distress. HENT:      Head: Normocephalic and atraumatic. Nose:      Comments: Wearing face mask  Eyes:      General: No scleral icterus. Pupils: Pupils are equal, round, and reactive to light. Cardiovascular:      Rate and Rhythm: Normal rate and regular rhythm. Pulmonary:      Effort: Pulmonary effort is normal. No respiratory distress. Abdominal:      General: There is no distension. Palpations: Abdomen is soft. Tenderness: There is abdominal tenderness. There is guarding. Hernia: A hernia is present. Comments: Incarcerated umbilical hernia with surrounding erythema, very tender to touch, unable to reduce   Musculoskeletal:         General: No swelling. Normal range of motion. Cervical back: Neck supple. No rigidity. Skin:     General: Skin is warm and dry. Findings: Erythema present. Neurological:      General: No focal deficit present. Mental Status: She is alert. Mental status is at baseline.    Psychiatric:         Mood and Affect: Mood normal.         Behavior: Behavior normal.         Impression   A large lobulated heterogeneous

## 2021-11-05 NOTE — PROGRESS NOTES
Ms. Sheree Allison is a 61year old female who presents with a complaint of a painful umbilical hernia. She states that she has had a bulge there for years, but it never caused her any problems. Then on , she started having pain there. It became worse, and she had worsening pain. She went to the ER yesterday, and a CT was done showing a strangulated umbilical hernia with concern for ischemia vs infection. She was started on antibiotics and given morphine to reduce it. She states that the redness is maybe a little better today, but it is still bulging out and tender. History reviewed. No pertinent past medical history. Past Surgical History:   Procedure Laterality Date    ADENOIDECTOMY      BREAST SURGERY      augmentation     SECTION      TONSILLECTOMY       Current Outpatient Medications   Medication Sig Dispense Refill    polyethylene glycol (MIRALAX) 17 g PACK packet Take 17 g by mouth daily      folic acid (FOLVITE) 695 MCG tablet Take 800 mcg by mouth daily      magnesium (MAGNESIUM-OXIDE) 250 MG TABS tablet Take 250 mg by mouth daily      Potassium 99 MG TABS Take by mouth      Aspirin Buf,[id number]-[id number]-PeFoi, (BUFFERED ASPIRIN) 325 MG TABS Take by mouth      vitamin B-12 (CYANOCOBALAMIN) 1000 MCG tablet Take 1,000 mcg by mouth daily      cephALEXin (KEFLEX) 500 MG capsule Take 1 capsule by mouth 2 times daily for 7 days 14 capsule 0     No current facility-administered medications for this visit. Allergies: Patient has no known allergies. Family History   Problem Relation Age of Onset    High Blood Pressure Mother     Other Mother         CHF    Other Father         unknown       Social History     Tobacco Use    Smoking status: Never Smoker    Smokeless tobacco: Never Used   Substance Use Topics    Alcohol use: Yes     Comment: occasional       Review of Systems   Constitutional: Negative for activity change and fever. HENT: Negative for congestion and sore throat. Eyes: Negative for pain and redness. Respiratory: Negative for cough and shortness of breath. Cardiovascular: Negative for chest pain and palpitations. Gastrointestinal: Positive for abdominal pain and constipation. Negative for abdominal distention, diarrhea and nausea. Endocrine: Negative for polydipsia and polyphagia. Genitourinary: Negative for dysuria and hematuria. Musculoskeletal: Negative for arthralgias and back pain. Neurological: Negative for dizziness and headaches. Psychiatric/Behavioral: Negative for confusion and dysphoric mood. Physical Exam  Vitals reviewed. Constitutional:       General: She is not in acute distress. HENT:      Head: Normocephalic and atraumatic. Nose:      Comments: Wearing face mask  Eyes:      General: No scleral icterus. Pupils: Pupils are equal, round, and reactive to light. Cardiovascular:      Rate and Rhythm: Normal rate and regular rhythm. Pulmonary:      Effort: Pulmonary effort is normal. No respiratory distress. Abdominal:      General: There is no distension. Palpations: Abdomen is soft. Tenderness: There is abdominal tenderness. There is guarding. Hernia: A hernia is present. Comments: Incarcerated umbilical hernia with surrounding erythema, very tender to touch, unable to reduce   Musculoskeletal:         General: No swelling. Normal range of motion. Cervical back: Neck supple. No rigidity. Skin:     General: Skin is warm and dry. Findings: Erythema present. Neurological:      General: No focal deficit present. Mental Status: She is alert. Mental status is at baseline. Psychiatric:         Mood and Affect: Mood normal.         Behavior: Behavior normal.       Impression   A large lobulated heterogeneous mass in the pelvis with   Calcifications and fat components may represent a dermoid cyst. This   may be further evaluated.    The periumbilical herniation of the omental fat with infiltration and   thickening which may suggest inflammation or ischemia. Possibility of   incarceration is not excluded. Clinical correlation and further   follow-up is recommended. Signed by Dr Sherryle Duffel           CBC:   Lab Results   Component Value Date    WBC 4.7 11/04/2021    RBC 4.59 11/04/2021    HGB 13.4 11/04/2021    HCT 42.7 11/04/2021    MCV 93.0 11/04/2021    MCH 29.2 11/04/2021    MCHC 31.4 11/04/2021    RDW 12.7 11/04/2021     11/04/2021    MPV 10.2 11/04/2021     BMP:    Lab Results   Component Value Date     11/04/2021    K 3.7 11/04/2021     11/04/2021    CO2 24 11/04/2021    BUN 9 11/04/2021    LABALBU 4.2 11/04/2021    CREATININE 0.7 11/04/2021    CALCIUM 9.0 11/04/2021    GFRAA >59 11/04/2021    LABGLOM >60 11/04/2021    GLUCOSE 92 11/04/2021       Assessment and plan:  61year old female with strangulated umbilical hernia  I discussed with the patient that with the amount of tenderness, as well as skin change, and incarceration, this hernia really does not need to wait to be repaired. I discussed the case with my partner Dr. Ned Cuadra, who is on call today, and is in agreement to go ahead and take her for surgery today. Will work on making arrangements to get this set up.     Maddi Elliott MD  11/5/2021  10:26 AM

## 2021-11-06 LAB
EKG P AXIS: 31 DEGREES
EKG P-R INTERVAL: 152 MS
EKG Q-T INTERVAL: 382 MS
EKG QRS DURATION: 72 MS
EKG QTC CALCULATION (BAZETT): 397 MS
EKG T AXIS: 45 DEGREES

## 2021-11-06 PROCEDURE — 93010 ELECTROCARDIOGRAM REPORT: CPT | Performed by: INTERNAL MEDICINE

## 2021-11-08 DIAGNOSIS — R19.00 PELVIC MASS: Primary | ICD-10-CM

## 2021-11-08 DIAGNOSIS — R19.00 PELVIC MASS IN FEMALE: Primary | ICD-10-CM

## 2021-11-09 LAB
BLOOD CULTURE, ROUTINE: NORMAL
CULTURE, BLOOD 2: NORMAL

## 2021-11-16 ENCOUNTER — OFFICE VISIT (OUTPATIENT)
Dept: SURGERY | Age: 66
End: 2021-11-16

## 2021-11-16 VITALS
DIASTOLIC BLOOD PRESSURE: 78 MMHG | WEIGHT: 147 LBS | SYSTOLIC BLOOD PRESSURE: 124 MMHG | BODY MASS INDEX: 23.63 KG/M2 | TEMPERATURE: 97.9 F | HEIGHT: 66 IN

## 2021-11-16 DIAGNOSIS — Z09 POSTOPERATIVE EXAMINATION: Primary | ICD-10-CM

## 2021-11-16 PROCEDURE — 99024 POSTOP FOLLOW-UP VISIT: CPT | Performed by: SURGERY

## 2021-11-16 RX ORDER — DOCUSATE SODIUM 100 MG/1
100 CAPSULE, LIQUID FILLED ORAL 2 TIMES DAILY
COMMUNITY

## 2021-11-16 NOTE — LETTER
[facility]  [address]  Phone: [phone]  Fax: [phone]    Maddi Elliott MD        November 16, 2021     Patient: Raven Spivey   YOB: 1955   Date of Visit: 11/16/2021       To Whom It May Concern: It is my medical opinion that Raven Spivey may return to work on 11/22/21 with the following restrictions: lifting/carrying not to exceed 20 lbs. , pushing/pulling not to exceed 20 lbs. .    If you have any questions or concerns, please don't hesitate to call.     Sincerely,        Maddi Elliott MD

## 2021-11-16 NOTE — PROGRESS NOTES
Postop Progress Note    Subjective    Angelina Raphael presents to the office for postop follow up, 1.5 weeks s/p open repair of incarcerated umbilical hernia with Dr. Joe Adams. She reports that she has been doing okay. she has some expected post operative pain, but no other issues. Objective    Vitals:    11/16/21 1010   BP: 124/78   Temp: 97.9 °F (36.6 °C)     General: alert, cooperative and no distress  Abdomen: Soft, NT, ND, incisions C/D/I, no erythema or induration    Assessment  62 yo female s/p open repair incarcerated umbilical hernia  1) Doing well post operatively.  Continue with lifting restrictions  2) Follow up in general surgery in 4 weeks    Electronically signed by Rupa Moore MD on 11/16/2021 at 10:22 AM

## 2021-12-03 ENCOUNTER — HOSPITAL ENCOUNTER (OUTPATIENT)
Dept: ULTRASOUND IMAGING | Age: 66
Discharge: HOME | End: 2021-12-03
Payer: MEDICAID

## 2021-12-03 ENCOUNTER — OFFICE VISIT (OUTPATIENT)
Dept: OBGYN CLINIC | Age: 66
End: 2021-12-03
Payer: MEDICAID

## 2021-12-03 VITALS
SYSTOLIC BLOOD PRESSURE: 118 MMHG | HEIGHT: 66 IN | WEIGHT: 141 LBS | DIASTOLIC BLOOD PRESSURE: 76 MMHG | BODY MASS INDEX: 22.66 KG/M2 | HEART RATE: 80 BPM

## 2021-12-03 DIAGNOSIS — Z71.2 ENCOUNTER TO DISCUSS TEST RESULTS: ICD-10-CM

## 2021-12-03 DIAGNOSIS — N85.9 FLUID IN ENDOMETRIAL CAVITY: ICD-10-CM

## 2021-12-03 DIAGNOSIS — D36.9 DERMOID: Primary | ICD-10-CM

## 2021-12-03 DIAGNOSIS — Z12.4 SCREENING FOR CERVICAL CANCER: ICD-10-CM

## 2021-12-03 DIAGNOSIS — Z11.51 SCREENING FOR HPV (HUMAN PAPILLOMAVIRUS): ICD-10-CM

## 2021-12-03 DIAGNOSIS — Z12.31 SCREENING MAMMOGRAM FOR BREAST CANCER: ICD-10-CM

## 2021-12-03 DIAGNOSIS — R19.00 PELVIC MASS IN FEMALE: ICD-10-CM

## 2021-12-03 DIAGNOSIS — Z76.89 ENCOUNTER TO ESTABLISH CARE: ICD-10-CM

## 2021-12-03 PROCEDURE — 76830 TRANSVAGINAL US NON-OB: CPT

## 2021-12-03 PROCEDURE — 99204 OFFICE O/P NEW MOD 45 MIN: CPT | Performed by: NURSE PRACTITIONER

## 2021-12-03 ASSESSMENT — ENCOUNTER SYMPTOMS
EYES NEGATIVE: 1
RESPIRATORY NEGATIVE: 1
GASTROINTESTINAL NEGATIVE: 1

## 2021-12-03 NOTE — PROGRESS NOTES
Juan Carlos Max is a 61 y.o. female who presents today for her medical conditions/ complaints as noted below. Juan Carlos Max is c/o of Establish Care and Results (tvus, poss dermoid cyst)        HPI  Patient presents today as a new patient to review TVUS ( poss dermoid cyst found on CT scan)    US findings discussed with pt 11cm likely dermoid noted with solid, cystic, and fat components noted. Also some fluid in the endometrial cavity noted. She had recent umbilical hernia surgery about 2 weeks ago    Last mammogram:  Age 56  Last pap smear:  Age 56  Contraception:  None, postmeno  :  1  Para:  1  AB:  0  Last bone density:    Last colonoscopy: never  No LMP recorded. Patient is postmenopausal.  No obstetric history on file. History reviewed. No pertinent past medical history.   Past Surgical History:   Procedure Laterality Date    ADENOIDECTOMY      BREAST SURGERY      augmentation     SECTION      TONSILLECTOMY      UMBILICAL HERNIA REPAIR N/A     HERNIA UMBILICAL REPAIR [facility] OR     Family History   Problem Relation Age of Onset    High Blood Pressure Mother     Other Mother         CHF    Other Father         unknown     Social History     Tobacco Use    Smoking status: Never Smoker    Smokeless tobacco: Never Used   Substance Use Topics    Alcohol use: Yes     Comment: occasional       Current Outpatient Medications   Medication Sig Dispense Refill    docusate sodium (COLACE) 100 MG capsule Take 100 mg by mouth 2 times daily      polyethylene glycol (MIRALAX) 17 g PACK packet Take 17 g by mouth daily      ondansetron (ZOFRAN-ODT) 4 MG disintegrating tablet Take 1 tablet by mouth every 6 hours as needed for Nausea or Vomiting 15 tablet 0    folic acid (FOLVITE) 517 MCG tablet Take 800 mcg by mouth daily      magnesium (MAGNESIUM-OXIDE) 250 MG TABS tablet Take 250 mg by mouth daily      Potassium 99 MG TABS Take by mouth      Aspirin Buf,[id number]-[id number], (BUFFERED ASPIRIN) 325 MG TABS Take by mouth      vitamin B-12 (CYANOCOBALAMIN) 1000 MCG tablet Take 1,000 mcg by mouth daily       No current facility-administered medications for this visit. No Known Allergies  Vitals:    12/03/21 0916   BP: 118/76   Pulse: 80     Body mass index is 22.76 kg/m². Review of Systems   Constitutional: Negative. HENT: Negative. Eyes: Negative. Respiratory: Negative. Cardiovascular: Negative. Gastrointestinal: Negative. Genitourinary: Negative for difficulty urinating, dyspareunia, dysuria, enuresis, frequency, hematuria, menstrual problem, pelvic pain, urgency and vaginal discharge. Musculoskeletal: Negative. Skin: Negative. Neurological: Negative. Psychiatric/Behavioral: Negative. Physical Exam  Vitals and nursing note reviewed. Constitutional:       General: She is not in acute distress. Appearance: She is well-developed. She is not diaphoretic. HENT:      Head: Normocephalic and atraumatic. Eyes:      Conjunctiva/sclera: Conjunctivae normal.      Pupils: Pupils are equal, round, and reactive to light. Pulmonary:      Effort: Pulmonary effort is normal.   Abdominal:      Tenderness: There is no guarding. Genitourinary:     Comments: Pap collected, cervical stenosis and displacement of uterus/cervix due to size of mass/dermoid. Unable to get endocervical brush through the cervix so did not attempt emb to obtain fluid  Musculoskeletal:         General: Normal range of motion. Cervical back: Normal range of motion. Comments: Normal ROM in all 4 extremities; normal gait   Skin:     General: Skin is warm and dry. Neurological:      Mental Status: She is alert and oriented to person, place, and time. Motor: No abnormal muscle tone. Coordination: Coordination normal.   Psychiatric:         Behavior: Behavior normal.          Diagnosis Orders   1. Dermoid     2.  Fluid in endometrial cavity     3. Encounter to establish care     4. Screening for cervical cancer  PAP SMEAR   5. Screening mammogram for breast cancer  ROSANA DIGITAL SCREEN W OR WO CAD BILATERAL   6. Encounter to discuss test results         MEDICATIONS:  No orders of the defined types were placed in this encounter. ORDERS:  Orders Placed This Encounter   Procedures    ROSANA DIGITAL SCREEN W OR WO CAD BILATERAL    PAP SMEAR       PLAN:  Pap collected  mammo ordered  Pt states getting signed up for insurance  Will forward chart on to one of the docs regarding OR for removal.   There are no Patient Instructions on file for this visit.     \

## 2021-12-06 DIAGNOSIS — Z12.4 SCREENING FOR CERVICAL CANCER: Primary | ICD-10-CM

## 2021-12-07 ENCOUNTER — TELEPHONE (OUTPATIENT)
Dept: OBGYN CLINIC | Age: 66
End: 2021-12-07

## 2021-12-07 NOTE — TELEPHONE ENCOUNTER
Called patient and explained that [place] wanted a surgical consultation for her ovarian cyst.  Appt made. She denies pain at this time.

## 2021-12-09 ENCOUNTER — OFFICE VISIT (OUTPATIENT)
Dept: OBGYN CLINIC | Age: 66
End: 2021-12-09
Payer: MEDICAID

## 2021-12-09 VITALS
SYSTOLIC BLOOD PRESSURE: 114 MMHG | BODY MASS INDEX: 23.14 KG/M2 | DIASTOLIC BLOOD PRESSURE: 72 MMHG | HEIGHT: 66 IN | WEIGHT: 144 LBS

## 2021-12-09 DIAGNOSIS — N83.8 OVARIAN MASS: ICD-10-CM

## 2021-12-09 DIAGNOSIS — Z78.0 POSTMENOPAUSAL: ICD-10-CM

## 2021-12-09 DIAGNOSIS — D36.9 DERMOID: ICD-10-CM

## 2021-12-09 DIAGNOSIS — D36.9 DERMOID: Primary | ICD-10-CM

## 2021-12-09 PROCEDURE — G8420 CALC BMI NORM PARAMETERS: HCPCS | Performed by: OBSTETRICS & GYNECOLOGY

## 2021-12-09 PROCEDURE — 99214 OFFICE O/P EST MOD 30 MIN: CPT | Performed by: OBSTETRICS & GYNECOLOGY

## 2021-12-09 PROCEDURE — G8427 DOCREV CUR MEDS BY ELIG CLIN: HCPCS | Performed by: OBSTETRICS & GYNECOLOGY

## 2021-12-09 PROCEDURE — 1036F TOBACCO NON-USER: CPT | Performed by: OBSTETRICS & GYNECOLOGY

## 2021-12-09 PROCEDURE — G8484 FLU IMMUNIZE NO ADMIN: HCPCS | Performed by: OBSTETRICS & GYNECOLOGY

## 2021-12-09 PROCEDURE — 3017F COLORECTAL CA SCREEN DOC REV: CPT | Performed by: OBSTETRICS & GYNECOLOGY

## 2021-12-09 ASSESSMENT — ENCOUNTER SYMPTOMS
GASTROINTESTINAL NEGATIVE: 1
EYES NEGATIVE: 1
RESPIRATORY NEGATIVE: 1

## 2021-12-09 NOTE — PROGRESS NOTES
Claudia Rosen is a 61 y.o.  who presents today for a discussion about her ovarian mass seen on ultrasound. It was an incidental finding from a scan for a hernia. Pt denies pain. Pt has had one c section, and . Pt had a umbilical hernia repair on 21. Review of Systems   Constitutional: Negative. HENT: Negative. Eyes: Negative. Respiratory: Negative. Cardiovascular: Negative. Gastrointestinal: Negative. Genitourinary: Negative. Negative for dysuria, frequency, menstrual problem, pelvic pain, urgency and vaginal discharge. Skin: Negative. Neurological: Negative. Psychiatric/Behavioral: Negative. No past medical history on file.     Past Surgical History:   Procedure Laterality Date    ADENOIDECTOMY      BREAST SURGERY      augmentation     SECTION      TONSILLECTOMY      UMBILICAL HERNIA REPAIR N/A     HERNIA UMBILICAL REPAIR performed by [facility]don[facility] at [facility] OR       Family History   Problem Relation Age of Onset    High Blood Pressure Mother     Other Mother         CHF    Other Father         unknown       Social History     Socioeconomic History    Marital status: Single     Spouse name: Not on file    Number of children: Not on file    Years of education: Not on file    Highest education level: Not on file   Occupational History    Not on file   Tobacco Use    Smoking status: Never Smoker    Smokeless tobacco: Never Used   Vaping Use    Vaping Use: Never used   Substance and Sexual Activity    Alcohol use: Yes     Comment: occasional    Drug use: Not Currently    Sexual activity: Not on file   Other Topics Concern    Not on file   Social History Narrative    Not on file     Social Determinants of Health     Financial Resource Strain:     Difficulty of Paying Living Expenses: Not on file   Food Insecurity:     Worried About [address] in the Last Year: Not on file    [address] in the Last Year: Not on file   Transportation Needs:     Lack of Transportation (Medical): Not on file    Lack of Transportation (Non-Medical):  Not on file   Physical Activity:     Days of Exercise per Week: Not on file    Minutes of Exercise per Session: Not on file   Stress:     Feeling of Stress : Not on file   Social Connections:     Frequency of Communication with Friends and Family: Not on file    Frequency of Social Gatherings with Friends and Family: Not on file    Attends Sikh Services: Not on file    Active Member of [address] [place] [address] or Organizations: Not on file    Attends Club or Organization Meetings: Not on file    Marital Status: Not on file   Intimate Partner Violence:     Fear of Current or Ex-Partner: Not on file    Emotionally Abused: Not on file    Physically Abused: Not on file    Sexually Abused: Not on file   Housing Stability:     Unable to Pay for Housing in the Last Year: Not on file    Number of [place] in the Last Year: Not on file    Unstable Housing in the Last Year: Not on file         Current Outpatient Medications:     docusate sodium (COLACE) 100 MG capsule, Take 100 mg by mouth 2 times daily (Patient not taking: Reported on 12/16/2021), Disp: , Rfl:     polyethylene glycol (MIRALAX) 17 g PACK packet, Take 17 g by mouth daily (Patient not taking: Reported on 12/16/2021), Disp: , Rfl:     ondansetron (ZOFRAN-ODT) 4 MG disintegrating tablet, Take 1 tablet by mouth every 6 hours as needed for Nausea or Vomiting (Patient not taking: Reported on 12/16/2021), Disp: 15 tablet, Rfl: 0    folic acid (FOLVITE) 975 MCG tablet, Take 800 mcg by mouth daily (Patient not taking: Reported on 12/16/2021), Disp: , Rfl:     magnesium (MAGNESIUM-OXIDE) 250 MG TABS tablet, Take 250 mg by mouth daily (Patient not taking: Reported on 12/16/2021), Disp: , Rfl:     Potassium 99 MG TABS, Take by mouth (Patient not taking: Reported on 12/16/2021), Disp: , Rfl:     Aspirin Buf,[id number], (BUFFERED ASPIRIN) 325 MG TABS, Take by mouth, Disp: , Rfl:     vitamin B-12 (CYANOCOBALAMIN) 1000 MCG tablet, Take 1,000 mcg by mouth daily (Patient not taking: Reported on 12/16/2021), Disp: , Rfl:     No Known Allergies    /72   Ht 5' 6\" (1.676 m)   Wt 144 lb (65.3 kg)   BMI 23.24 kg/m²   Physical Exam  Constitutional:       General: She is not in acute distress. Appearance: She is well-developed. She is not diaphoretic. HENT:      Head: Normocephalic and atraumatic. Hair is normal.      Right Ear: Hearing and external ear normal. No decreased hearing noted. Left Ear: Hearing and external ear normal. No decreased hearing noted. Nose: Nose normal. No rhinorrhea. Mouth/Throat:      Dentition: Normal dentition. Eyes:      General:         Right eye: No discharge. Left eye: No discharge. Conjunctiva/sclera: Conjunctivae normal.      Pupils: Pupils are equal, round, and reactive to light. Pulmonary:      Effort: Pulmonary effort is normal. No respiratory distress. Musculoskeletal:         General: No deformity. Normal range of motion. Cervical back: Normal range of motion. Skin:     General: Skin is warm and dry. Coloration: Skin is not pale. Findings: No rash. Neurological:      Mental Status: She is alert and oriented to person, place, and time. Cranial Nerves: No cranial nerve deficit. Psychiatric:         Speech: Speech normal.         Behavior: Behavior normal.         Thought Content: Thought content normal.         Judgment: Judgment normal.         Narrative   EXAMINATION: US NON OB TRANSVAGINAL 12/3/2021 10:22 AM   HISTORY: Pelvic mass on CT, possible dermoid   COMPARISON: CT abdomen pelvis with contrast 11/4/2021   FINDINGS:   Transvaginal pelvic ultrasound was performed in the transverse and   longitudinal projections. There is a large adnexal mass that exerts mass effect upon the uterus.    The uterus measures approximately 4.2 x 1.9 x 2.6 cm in size. There is   fluid in the expected region of the endometrial canal. No normal   endometrial stripe is identified. The cervix is grossly normal in appearance. The right ovary is not demonstrated in the right adnexa. At midline,   there is a heterogeneous mass which extends into the left adnexal   region. This is predominantly cystic but has what appears to be a   solid component containing blood flow, possibly the normal ovary. There is a fatty component in the right adnexal region which measures   3.4 x 3.1 x 3.4 cm in size. A fluid fat level is evident in another   portion of the mass. The mass is somewhat difficult to measure but   appears to span approximately 10.8 x 7.8 x 11.1 cm in size. The solid   component measures 3.2 x 3.4 x 3.4 cm.       Impression   Heterogeneous pelvic mass containing areas of fluid, fat,   and solid material. This most likely represents a dermoid. Due to size   greater than 7 cm, resection should be considered. Signed by Dr Kelly Boyd           Narrative   Examination. CT ABDOMEN PELVIS W IV CONTRAST 11/4/2021 12:23 PM   History: The patient has periumbilical erythema and possible umbilical   hernia. No previous surgery. DLP: 322 mGycm. The CT scan of the abdomen and pelvis is performed after intravenous   contrast enhancement. The images are acquired in axial plane with   subsequent reconstruction in coronal and sagittal planes. There is no previous study for comparison. The lung bases included in the study appear unremarkable except for   some scarring atelectasis and a pneumatocele in the right lower lung. The limited visualized cardiomediastinal structures are normal.   There is a moderate size sliding-type heart of hernia. The liver and spleen are normal.   No radiopaque gallstones. The pancreas is normal.   The adrenal glands are normal.   The renal outline bilaterally is normal. No calculi. There is moderate   left hydronephrosis. There is moderate dilatation of the left ureter   which is compressed and displaced by a large mass in the pelvis which   will be described later. The urinary bladder is moderately distended   and is compressed inferiorly and anteriorly by the large pelvic mass. No obvious intrinsic abnormality. The stomach and duodenum are normal. The small bowel is nondistended. No finding to suggest appendicitis. Large amount of stool in the colon   no finding to suggest obstruction. Normal size uterus is seen. There is a large lobulated heterogeneous   mass in the pelvis. Intrinsic calcification, peripherally calcified   cystic components and fat components. The fatty mass component to the   left of the mass measures 4.3 x 3.9 x 4.7 cm and have some tension   conclusions which may represent hair? Ori Trinh There is a solid component of   the mass adjacent to the fatty component which measures 4 cm in   diameter. The entire mass measures 11.8 x 11.2 x 8.8 cm. The origin of   the mass is not certain. There is a paraumbilical hernia with herniation of omental fat. There   is infiltration and thickening of the herniated fat which may   represent an inflammatory or ischemic process. This may represent an   incarcerated hernia? .   Normal abdominal aorta and iliac arteries. There is no evidence of abdominal or pelvic lymphadenopathy. The images reviewed in bone window show no acute bony abnormality or a   bone lesion. Chronic degenerative changes of the lumbar spine are   noted       Impression   A large lobulated heterogeneous mass in the pelvis with   Calcifications and fat components may represent a dermoid cyst. This   may be further evaluated. The periumbilical herniation of the omental fat with infiltration and   thickening which may suggest inflammation or ischemia. Possibility of   incarceration is not excluded. Clinical correlation and further   follow-up is recommended.    Signed by Dr Cassie Arndt     Assessment   Diagnosis Orders   1. Dermoid  Cancer Antigen 125    External Referral To Gynecologic Oncology   2. Ovarian mass  Cancer Antigen 125    External Referral To Gynecologic Oncology   3. Postmenopausal  External Referral To Gynecologic Oncology       Plan     1. Pelvic ultrasound results reviewed: told she has an ovarian mass. 2. Treatment options discussed: mass will need to be removed surgically, but because it has some solid components and the size, will need to refer to [address] [place] [address].   3. Will call pt with appt with [facility], pt does not have insurance at this time, is trying to get some. Will call [facility] to see what her options are. Kentrell Zepeda, am scribing for and in the presence of Dr. Bob Alba. I, Dr. Bob Alba, personally performed the services described in this documentation as scribed by Patrice Burt in my presence, and it is both accurate and complete.

## 2021-12-09 NOTE — PROGRESS NOTES
Pt is here today for a surgical consult. She is want an ovarian cyst removed. She had an u/s on 12/03.

## 2021-12-10 LAB
HPV COMMENT: NORMAL
HPV TYPE 16: NOT DETECTED
HPV TYPE 18: NOT DETECTED
HPVOH (OTHER TYPES): NOT DETECTED

## 2021-12-13 ENCOUNTER — TELEPHONE (OUTPATIENT)
Dept: OBGYN CLINIC | Age: 66
End: 2021-12-13

## 2021-12-13 LAB — CA 125: 23 U/ML (ref 0–35)

## 2021-12-13 NOTE — TELEPHONE ENCOUNTER
Called Dr. Дмитрий Stone office 3x's and finally left a voicemail wanting to schedule a referral from Dr. Chucky Gunn. Left message for them to call our office so we can get that scheduled.

## 2021-12-15 ENCOUNTER — TELEPHONE (OUTPATIENT)
Dept: OBGYN CLINIC | Age: 66
End: 2021-12-15

## 2021-12-16 ENCOUNTER — OFFICE VISIT (OUTPATIENT)
Dept: SURGERY | Age: 66
End: 2021-12-16

## 2021-12-16 VITALS
BODY MASS INDEX: 23.88 KG/M2 | DIASTOLIC BLOOD PRESSURE: 68 MMHG | TEMPERATURE: 97.1 F | HEIGHT: 66 IN | SYSTOLIC BLOOD PRESSURE: 110 MMHG | WEIGHT: 148.6 LBS

## 2021-12-16 DIAGNOSIS — Z09 POSTOPERATIVE EXAMINATION: Primary | ICD-10-CM

## 2021-12-16 PROCEDURE — 99024 POSTOP FOLLOW-UP VISIT: CPT | Performed by: SURGERY
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
## (undated) DEVICE — STERILE POLYISOPRENE POWDER-FREE SURGICAL GLOVES: Brand: PROTEXIS

## (undated) DEVICE — TIBURON LAPAROTOMY DRAPE: Brand: CONVERTORS

## (undated) DEVICE — DECANTER VI VENT W/ VLV FOR ASEP TRNSF OF FLD

## (undated) DEVICE — SUTURE VCRL SZ 3-0 L27IN ABSRB UD L26MM SH 1/2 CIR J416H

## (undated) DEVICE — MINOR CDS: Brand: MEDLINE INDUSTRIES, INC.

## (undated) DEVICE — SUTURE PDS + SZ 0 L27IN ABSRB VLT L36MM CT 1 1 2 CIR PDP340H

## (undated) DEVICE — ADHESIVE SKIN CLSR 0.7ML TOP DERMBND ADV

## (undated) DEVICE — TUBE ET 7MM NSL ORAL BASIC CUF INTMED MURPHY EYE RADPQ MRK

## (undated) DEVICE — BLADE LARYNSCP HNDL MAC 3 DISP CURAVIEW LED